# Patient Record
Sex: FEMALE | Race: BLACK OR AFRICAN AMERICAN | NOT HISPANIC OR LATINO | Employment: UNEMPLOYED | URBAN - METROPOLITAN AREA
[De-identification: names, ages, dates, MRNs, and addresses within clinical notes are randomized per-mention and may not be internally consistent; named-entity substitution may affect disease eponyms.]

---

## 2022-11-07 ENCOUNTER — OFFICE VISIT (OUTPATIENT)
Dept: FAMILY MEDICINE | Facility: CLINIC | Age: 57
End: 2022-11-07
Payer: COMMERCIAL

## 2022-11-07 VITALS
TEMPERATURE: 98.7 F | OXYGEN SATURATION: 97 % | WEIGHT: 205 LBS | RESPIRATION RATE: 16 BRPM | HEART RATE: 74 BPM | DIASTOLIC BLOOD PRESSURE: 76 MMHG | SYSTOLIC BLOOD PRESSURE: 142 MMHG
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
## 2022-11-07 DIAGNOSIS — S09.93XA INJURY OF TOOTH, INITIAL ENCOUNTER: ICD-10-CM

## 2022-11-07 DIAGNOSIS — S49.91XA INJURY OF RIGHT SHOULDER, INITIAL ENCOUNTER: ICD-10-CM

## 2022-11-07 DIAGNOSIS — S13.4XXA WHIPLASH INJURY TO NECK, INITIAL ENCOUNTER: ICD-10-CM

## 2022-11-07 PROCEDURE — 99203 OFFICE O/P NEW LOW 30 MIN: CPT | Performed by: PHYSICIAN ASSISTANT

## 2022-11-07 RX ORDER — NAPROXEN 500 MG/1
500 TABLET ORAL 2 TIMES DAILY PRN
Qty: 30 TABLET | Refills: 0 | Status: SHIPPED | OUTPATIENT
Start: 2022-11-07
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
## 2022-11-07 NOTE — PATIENT INSTRUCTIONS
"  There is an emergency dental clinic at the [facility] in [place], which is open Monday through Friday. Please call [phone] and choose \"dental clinic\" option to schedule an appointment.     Additional Dental Clinics with no or reduced fees    [facility]   [facility]  [address], [facility]  [phone] Accepts [place]   [facility]  [address] , [facility]  [phone] No Fee Hours and services vary each month - call them before going. Sometimes only offer extractions.   [facility]  [address] , [facility]  [phone] Sliding fee: ER visit - $50 up front  regular - $35 up front Call or arrive at 7:45 AM on Mondays, Tues, or Thursdays to sign up for same-day appointments for dental pain / emergencies.     [facility] Sliding fee  Call for details Walk-ins and same-day appointments  Walk-in's accepted 8-11AM and 1-4PM  Monday-Friday   Critical access hospital[facility] [address], [facility]   [phone]     [facility] dental Sliding fee If no insurance, call first.   [address], [facility]   [phone]     [facility]  [address], [facility]  [phone] Sliding fee Call 8:00 AM Mon-Thurs for next-day  appointments (for emergencies/pain only) Help with [place] is available.     [facility]  [address], [facility]  [phone] Call for fees Walk-in appointments available from 8am-3:30pm. Standard appointments also available.       [facility]  [address]  [address]  [phone] No cost Open Monday- Thursday, 8am-9pm     [facility]   [address] [facility] [address]  [phone] Sliding fee  $40 up front No walk-ins. Call at 8AM Mon-Fri for same- day visits. Can schedule Saturday emergency visits by calling Friday morning.   [facility]  [address]  [phone] Sliding fee  Call for details No " walk-ins. For emergency appointments:  Call on Thursday 3PM (for Friday appt) OR Call on Friday 3PM (for Monday appt)   [facility]  [address] [facility]  [phone] Sliding fee- Call for Details Mon, Tues 7am-5:30pm  Wed 7am-8pm  Thurs 7am-7pm  Fri 8am-5pm   [facility]  [address] [facility]  [phone] Sliding fee -   Call for details Mon, Tues, Thurs, Fri- 8am-4:30 PM  Wed 8:30 AM to 8 pm       OTHER RESOURCES   Emergency Dental Care [facility],   [facility] [address]  [address] [place] [address]  [phone]    Referral Service, 1-800-DENTIST   Open 9a to 9p 7 days a week      [facility], [phone] For people who are elderly, disabled, or medically compromised and have no other way to pay for dental care. Call to get an application. If approved, services are provided through volunteer dentists.